# Patient Record
Sex: MALE | Race: WHITE | URBAN - METROPOLITAN AREA
[De-identification: names, ages, dates, MRNs, and addresses within clinical notes are randomized per-mention and may not be internally consistent; named-entity substitution may affect disease eponyms.]

---

## 2021-04-28 ENCOUNTER — OFFICE (OUTPATIENT)
Dept: URBAN - METROPOLITAN AREA CLINIC 79 | Facility: CLINIC | Age: 66
End: 2021-04-28

## 2021-04-28 VITALS
HEART RATE: 80 BPM | TEMPERATURE: 97.4 F | HEIGHT: 71 IN | DIASTOLIC BLOOD PRESSURE: 51 MMHG | SYSTOLIC BLOOD PRESSURE: 107 MMHG | WEIGHT: 188 LBS

## 2021-04-28 DIAGNOSIS — K74.60 UNSPECIFIED CIRRHOSIS OF LIVER: ICD-10-CM

## 2021-04-28 DIAGNOSIS — K72.90 HEPATIC FAILURE, UNSPECIFIED WITHOUT COMA: ICD-10-CM

## 2021-04-28 PROCEDURE — 99204 OFFICE O/P NEW MOD 45 MIN: CPT | Performed by: PHYSICIAN ASSISTANT

## 2021-04-28 NOTE — SERVICEHPINOTES
VERONICA SMITH   is a   66   year old male who is being seen in consultation at the request of   JESSA OROZCO   for liver disease. Has cirrhosis with h/o Hep C (reports cure in past) and ETOH use (stopped in 2000). Recently to the ER for confusion, had high ammonia and was treated with lactulose. Patient was being followed by Eduardo Garcia PA-C with [facility] and also another GI and has had EGDs, colonoscopies, MRI of liver. He is looking to transfer care given location of our practice being more convenient for him. He is using lactulose, has cut back due to diarrhea. He is on warfarin, unclear why. Denies NSAID use. Overall doing ok today. No other concerns.

## 2021-06-02 ENCOUNTER — OFFICE (OUTPATIENT)
Dept: URBAN - METROPOLITAN AREA CLINIC 79 | Facility: CLINIC | Age: 66
End: 2021-06-02

## 2021-06-02 VITALS
DIASTOLIC BLOOD PRESSURE: 47 MMHG | SYSTOLIC BLOOD PRESSURE: 82 MMHG | HEART RATE: 71 BPM | TEMPERATURE: 97 F | WEIGHT: 186 LBS | HEIGHT: 71 IN

## 2021-06-02 DIAGNOSIS — K74.60 UNSPECIFIED CIRRHOSIS OF LIVER: ICD-10-CM

## 2021-06-02 PROCEDURE — 99214 OFFICE O/P EST MOD 30 MIN: CPT | Performed by: PHYSICIAN ASSISTANT

## 2021-06-02 NOTE — SERVICEHPINOTES
67 yo male presents for f/u liver disease. Has cirrhosis with h/o Hep C (reports cure in past) and ETOH use (stopped in 2000). Recently to the ER for confusion, had high ammonia and was treated with lactulose. Patient was being followed by Eduardo Garcia PA-C with [facility] and also another GI and has had EGDs, colonoscopies, MRI of liver. 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 Reports last MRI within the past few months, though we didn't get a record of this. He is using lactulose, had cut back due to diarrhea so is using enough to try to maintain BMs - he is aware of goal of 2-3 BMs per day. He denies HE symptoms currently. His last EGD was just in December and showed grade 1 varices. Denies NSAID use. Overall doing ok today. No other concerns. BP is low chronically. His BP med is managed by cardiologist.